# Patient Record
Sex: FEMALE | Race: BLACK OR AFRICAN AMERICAN | NOT HISPANIC OR LATINO | Employment: PART TIME | URBAN - METROPOLITAN AREA
[De-identification: names, ages, dates, MRNs, and addresses within clinical notes are randomized per-mention and may not be internally consistent; named-entity substitution may affect disease eponyms.]

---

## 2023-04-10 ENCOUNTER — HOSPITAL ENCOUNTER (EMERGENCY)
Facility: CLINIC | Age: 18
Discharge: HOME | End: 2023-04-10
Attending: EMERGENCY MEDICINE | Admitting: EMERGENCY MEDICINE
Payer: COMMERCIAL

## 2023-04-10 VITALS
OXYGEN SATURATION: 99 % | TEMPERATURE: 98.3 F | RESPIRATION RATE: 16 BRPM | DIASTOLIC BLOOD PRESSURE: 56 MMHG | SYSTOLIC BLOOD PRESSURE: 99 MMHG | HEART RATE: 62 BPM | WEIGHT: 133.2 LBS

## 2023-04-10 DIAGNOSIS — L50.9 URTICARIA: ICD-10-CM

## 2023-04-10 PROCEDURE — 99284 EMERGENCY DEPT VISIT MOD MDM: CPT

## 2023-04-10 RX ORDER — PREDNISONE 20 MG/1
TABLET ORAL
Qty: 10 TABLET | Refills: 0 | Status: SHIPPED | OUTPATIENT
Start: 2023-04-10

## 2023-04-10 RX ORDER — FAMOTIDINE 20 MG/1
20 TABLET, FILM COATED ORAL 2 TIMES DAILY
Qty: 28 TABLET | Refills: 0 | Status: SHIPPED | OUTPATIENT
Start: 2023-04-10 | End: 2023-04-24

## 2023-04-10 RX ORDER — DIPHENHYDRAMINE HCL 25 MG
25 CAPSULE ORAL EVERY 6 HOURS PRN
Qty: 30 CAPSULE | Refills: 0 | Status: SHIPPED | OUTPATIENT
Start: 2023-04-10

## 2023-04-10 ASSESSMENT — ENCOUNTER SYMPTOMS
SHORTNESS OF BREATH: 0
NAUSEA: 1
LIGHT-HEADEDNESS: 1

## 2023-04-10 ASSESSMENT — ACTIVITIES OF DAILY LIVING (ADL): ADLS_ACUITY_SCORE: 33

## 2023-04-11 NOTE — ED TRIAGE NOTES
Here for hives all over body, unsure of what caused this reaction.   Denies shortness of breath, no swelling on lips or inside mouth  No medications PTA     Triage Assessment     Row Name 04/10/23 2019       Triage Assessment (Adult)    Airway WDL WDL       Respiratory WDL    Respiratory WDL WDL       Skin Circulation/Temperature WDL    Skin Circulation/Temperature WDL X  rash all over body       Cardiac WDL    Cardiac WDL WDL       Peripheral/Neurovascular WDL    Peripheral Neurovascular WDL WDL       Cognitive/Neuro/Behavioral WDL    Cognitive/Neuro/Behavioral WDL WDL

## 2023-04-11 NOTE — ED NOTES
Discharge instructions discussed with patient and all questions answered at time of discharge. Pt agreeable with discharge plan of care. VSS. Pt breathing spontaneously, equal unlabored breaths at discharge. Denies any symptoms of SOB or swelling.     See provider assessment, level 4 acuity.

## 2023-04-11 NOTE — ED PROVIDER NOTES
EMERGENCY DEPARTMENT ENCOUNTER      NAME: Domonique Azar  AGE: 18 year old female  YOB: 2005  MRN: [id number]  EVALUATION DATE & TIME: 4/10/2023 10:34 PM    PCP: No Ref-Primary, Physician    ED PROVIDER: Pati Gonzalez MD      Chief Complaint   Patient presents with     Allergic Reaction         FINAL IMPRESSION:  1. Urticaria          ED COURSE & MEDICAL DECISION MAKING:    Pertinent Labs & Imaging studies reviewed. (See chart for details)  18 year old female presents to the Emergency Department for evaluation of urticaria in her posterior neck and upper extremities.  The patient denies any new exposures.  She denies any systemic symptoms with no throat tightness, shortness of breath, nausea, vomiting, diarrhea, abdominal pain.  She did not take anything for symptoms prior to arrival.  On my exam, the patient has a maculopapular rash on her posterior neck and her upper extremities faintly.  We will plan to treat for urticaria and place a referral to allergy as discussed with the patient.    I met with the patient, obtained history, performed an initial exam, and discussed options and plan for diagnostics and treatment here in the ED. PPE worn including surgical mask, surgical gloves.    At the conclusion of the encounter I discussed the results of all of the tests and the disposition. The questions were answered. The patient or family acknowledged understanding and was agreeable with the care plan.       Medical Decision Making    History:    Supplemental history from: Documented in chart, if applicable    External Record(s) reviewed: Documented in chart, if applicable.    Work Up:    Chart documentation includes differential considered and any EKGs or imaging independently interpreted by provider, where specified.    In additional to work up documented, I considered the following work up: Documented in chart, if applicable.    External consultation:    Discussion of management with another  provider: Documented in chart, if applicable    Complicating factors:    Care impacted by chronic illness: N/A    Care affected by social determinants of health: N/A    Disposition considerations: Discharge. I prescribed additional prescription strength medication(s) as charted. See documentation for any additional details.      MEDICATIONS GIVEN IN THE EMERGENCY:  Medications - No data to display    NEW PRESCRIPTIONS STARTED AT TODAY'S ER VISIT  Discharge Medication List as of 4/10/2023 11:01 PM      START taking these medications    Details   diphenhydrAMINE (BENADRYL) 25 MG capsule Take 1 capsule (25 mg) by mouth every 6 hours as needed for itching or allergies, Disp-30 capsule, R-0, E-Prescribe      famotidine (PEPCID) 20 MG tablet Take 1 tablet (20 mg) by mouth 2 times daily for 14 days, Disp-28 tablet, R-0, E-Prescribe      predniSONE (DELTASONE) 20 MG tablet Take two tablets (= 40mg) each day for 5 (five) days, Disp-10 tablet, R-0, E-Prescribe                =================================================================    HPI    Patient information was obtained from: Patient    Use of : N/A         Domonique Azar is a 18 year old female with a pertinent history of eczema who presents to this ED by walk in for evaluation of rash, possible allergic reaction. Patient states she noticed a rash when she left work at 8 PM. Patient states the rash is primarily localized to her neck, but is also present in her bilateral arms, upper back. She states the rash is itchy. She notes associated intermittent nausea and mild lightheadedness.    She endorses a similar rash in the past and [facility]. Patient states her rash then was caused by her allergy to trees and grass. She denies any recent exposures to new allergens such as new lotions, detergents, medications. No intraoral swelling, shortness of breath. No other reported complaints at this time.      REVIEW OF  SYSTEMS   Review of Systems   HENT:        Denies intraoral swelling.   Respiratory: Negative for shortness of breath.    Gastrointestinal: Positive for nausea (occasional).   Skin: Positive for rash (arms, neck , back).        Positive for skin itching.   Neurological: Positive for light-headedness (mild).   All other systems reviewed and are negative.       PAST MEDICAL HISTORY:  History reviewed. No pertinent past medical history.    PAST SURGICAL HISTORY:  History reviewed. No pertinent surgical history.        CURRENT MEDICATIONS:    diphenhydrAMINE (BENADRYL) 25 MG capsule  famotidine (PEPCID) 20 MG tablet  predniSONE (DELTASONE) 20 MG tablet        ALLERGIES:  Allergies   Allergen Reactions     Grass      Seasonal Allergies      Sulfamethoxazole W/Trimethoprim      Sulfamethoxazole-Trimethoprim Other (See Comments)     Trees        FAMILY HISTORY:  History reviewed. No pertinent family history.    SOCIAL HISTORY:   Social History     Socioeconomic History     Marital status: Single       VITALS:  BP 99/56   Pulse 62   Temp 98.3  F (36.8  C) (Temporal)   Resp 16   Wt 60.4 kg (133 lb 3.2 oz)   LMP 03/10/2023   SpO2 99%     PHYSICAL EXAM    Constitutional: Well developed, Well nourished, NAD  HENT: Normocephalic, Atraumatic, Bilateral external ears normal, Oropharynx normal, mucous membranes moist, Nose normal. No angioedema, no macroglossia.  Neck- Normal range of motion, No tenderness, Supple, No stridor.  Eyes: PERRL, EOMI, Conjunctiva normal, No discharge.   Respiratory: Normal breath sounds, No respiratory distress  Cardiovascular: Normal heart rate, Regular rhythm  GI: Bowel sounds normal, Soft, No tenderness,   Musculoskeletal: No edema. Good range of motion in all major joints. No tenderness to palpation or major deformities noted.   Integument: Warm, Dry, No erythema. Maculopapular rash of her posterior neck and upper extremities proximal to the elbows.  Neurologic: Alert & oriented x 3, Normal  motor function, Normal sensory function, No focal deficits noted. Normal gait.   Psychiatric: Affect normal, Judgment normal, Mood normal.      LAB:  All pertinent labs reviewed and interpreted.       RADIOLOGY:  Reviewed all pertinent imaging. Please see official radiology report.  No orders to display         I, Ry Joyce, am serving as a scribe to document services personally performed by Pati Gonzalez, based on my observation and the provider's statements to me. I, Pati Gonzalez MD, attest that Ry Joyce is acting in a scribe capacity, has observed my performance of the services and has documented them in accordance with my direction.    Pati Gonzalez MD  Emergency Medicine  [facility] EMERGENCY ROOM  [address] [id number] [phone]     Pati Gonzalez MD  04/10/23 9037

## 2023-05-21 ENCOUNTER — HEALTH MAINTENANCE LETTER (OUTPATIENT)
Age: 18
End: 2023-05-21

## 2023-08-01 PROCEDURE — 99283 EMERGENCY DEPT VISIT LOW MDM: CPT

## 2023-08-02 ENCOUNTER — HOSPITAL ENCOUNTER (EMERGENCY)
Facility: CLINIC | Age: 18
Discharge: HOME | End: 2023-08-02
Attending: EMERGENCY MEDICINE | Admitting: EMERGENCY MEDICINE
Payer: COMMERCIAL

## 2023-08-02 ENCOUNTER — APPOINTMENT (OUTPATIENT)
Dept: RADIOLOGY | Facility: CLINIC | Age: 18
End: 2023-08-02
Attending: EMERGENCY MEDICINE
Payer: COMMERCIAL

## 2023-08-02 VITALS
HEART RATE: 78 BPM | RESPIRATION RATE: 16 BRPM | SYSTOLIC BLOOD PRESSURE: 99 MMHG | OXYGEN SATURATION: 98 % | DIASTOLIC BLOOD PRESSURE: 52 MMHG | TEMPERATURE: 98 F | BODY MASS INDEX: 23 KG/M2 | WEIGHT: 125 LBS | HEIGHT: 62 IN

## 2023-08-02 DIAGNOSIS — M54.2 NECK PAIN: ICD-10-CM

## 2023-08-02 PROCEDURE — 72040 X-RAY EXAM NECK SPINE 2-3 VW: CPT

## 2023-08-02 ASSESSMENT — ACTIVITIES OF DAILY LIVING (ADL): ADLS_ACUITY_SCORE: 35
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
## 2023-08-02 NOTE — DISCHARGE INSTRUCTIONS
Tylenol 650 mg every 4 hours as needed for pain  Ibuprofen 600 mg every 6 hours as needed for pain  Ice to your neck for 10 to 15 minutes every 1-2 hours for the next 1 to 2 days  Follow-up with your primary care provider within the next week for recheck  No strenuous activity like running, jogging, jumping, or weightlifting for the next 1 to 2 weeks  Return to the emergency department for worsening problems or concerns
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
## 2024-01-27 ENCOUNTER — HOSPITAL ENCOUNTER (EMERGENCY)
Facility: CLINIC | Age: 19
Discharge: HOME | End: 2024-01-27
Attending: EMERGENCY MEDICINE | Admitting: EMERGENCY MEDICINE

## 2024-01-27 VITALS
HEART RATE: 71 BPM | TEMPERATURE: 98.1 F | OXYGEN SATURATION: 98 % | DIASTOLIC BLOOD PRESSURE: 78 MMHG | SYSTOLIC BLOOD PRESSURE: 114 MMHG | RESPIRATION RATE: 12 BRPM

## 2024-01-27 DIAGNOSIS — K08.89 PAIN, DENTAL: ICD-10-CM

## 2024-01-27 PROCEDURE — 99284 EMERGENCY DEPT VISIT MOD MDM: CPT

## 2024-01-27 PROCEDURE — 250N000013 HC RX MED GY IP 250 OP 250 PS 637: Performed by: EMERGENCY MEDICINE

## 2024-01-27 RX ORDER — IBUPROFEN 600 MG/1
600 TABLET, FILM COATED ORAL ONCE
Status: COMPLETED | OUTPATIENT
Start: 2024-01-27 | End: 2024-01-27

## 2024-01-27 RX ORDER — HYDROCODONE BITARTRATE AND ACETAMINOPHEN 5; 325 MG/1; MG/1
1 TABLET ORAL EVERY 6 HOURS PRN
Qty: 10 TABLET | Refills: 0 | Status: SHIPPED | OUTPATIENT
Start: 2024-01-27 | End: 2024-01-30

## 2024-01-27 RX ORDER — HYDROCODONE BITARTRATE AND ACETAMINOPHEN 5; 325 MG/1; MG/1
1 TABLET ORAL ONCE
Status: COMPLETED | OUTPATIENT
Start: 2024-01-27 | End: 2024-01-27

## 2024-01-27 RX ORDER — PENICILLIN V POTASSIUM 500 MG/1
500 TABLET, FILM COATED ORAL 4 TIMES DAILY
Qty: 28 TABLET | Refills: 0 | Status: SHIPPED | OUTPATIENT
Start: 2024-01-27 | End: 2024-02-03

## 2024-01-27 RX ORDER — IBUPROFEN 600 MG/1
600 TABLET, FILM COATED ORAL EVERY 6 HOURS PRN
Qty: 30 TABLET | Refills: 0 | Status: SHIPPED | OUTPATIENT
Start: 2024-01-27

## 2024-01-27 RX ADMIN — IBUPROFEN 600 MG: 600 TABLET ORAL at 06:28

## 2024-01-27 RX ADMIN — HYDROCODONE BITARTRATE AND ACETAMINOPHEN 1 TABLET: 5; 325 TABLET ORAL at 06:28

## 2024-01-27 NOTE — ED TRIAGE NOTES
Lower right dental pain. Facial swelling present. Painful mastication. Denies throat pain. Pain radiates to inferior of right ear. Pain began 3 days ago. Feels that a filling was cracked. Had the filling placed 3 to 4 months ago. States gums also feel sore.

## 2024-01-27 NOTE — ED PROVIDER NOTES
EMERGENCY DEPARTMENT ENCOUNTER      NAME: Domonique Azar  AGE: 18 year old female  YOB: 2005  MRN: [id number]  EVALUATION DATE & TIME: 1/27/2024  6:03 AM    PCP: No Ref-Primary, Physician    ED PROVIDER: Jonathan Torres M.D.      Chief Complaint   Patient presents with    Dental Pain         FINAL IMPRESSION:  1. Pain, dental          ED COURSE & MEDICAL DECISION MAKING:    Pertinent Labs & Imaging studies reviewed. (See chart for details)  18 year old female presents to the Emergency Department for evaluation of lower right dental pain symptoms ongoing the last 3 days.  Has noted some slight increase swelling on the right side also.  Worsens significantly with chewing.  Review of records indicate no chronic disease states.  Patient arrives with significant other who provides associated information.  Examination of teeth resulting in general good repair.  She has marked percussive tenderness to tooth #30.  Gingiva appears normal.  Slight fullness along the right mandible but no obvious fluctuance or masses.  No adenopathy.  Neck supple.  Cardiac exam remarkable for absence of murmur.  Will treat symptomatically with ibuprofen and hydrocodone here.  Patient discharged with hydrocodone/Motrin and bacillin.  Patient reports she had contacted her dentist and has follow-up available next week.  Patient appears non toxic with stable vitals signs. Overall exam is benign.  Given uncomplicated dental pain no indications for laboratory evaluation or imaging.          6:01 AM I met with the patient for the initial interview and physical examination. Discussed plan for treatment and workup in the ED.    6:21 AM Patient will be discharged with prescription medications.    At the conclusion of the encounter I discussed the results of all of the tests and the disposition. The questions were answered and return precautions provided. The patient or family acknowledged understanding and was agreeable with the care plan.        PPE: Provider wore paper mask.     MEDICATIONS GIVEN IN THE EMERGENCY:  Medications   HYDROcodone-acetaminophen (NORCO) 5-325 MG per tablet 1 tablet (has no administration in time range)   ibuprofen (ADVIL/MOTRIN) tablet 600 mg (has no administration in time range)       NEW PRESCRIPTIONS STARTED AT TODAY'S ER VISIT  New Prescriptions    HYDROCODONE-ACETAMINOPHEN (NORCO) 5-325 MG TABLET    Take 1 tablet by mouth every 6 hours as needed    IBUPROFEN (ADVIL/MOTRIN) 600 MG TABLET    Take 1 tablet (600 mg) by mouth every 6 hours as needed for moderate pain    PENICILLIN V (VEETID) 500 MG TABLET    Take 1 tablet (500 mg) by mouth 4 times daily for 7 days          =================================================================    HPI    Patient information was obtained from: Patient    Use of Intrepreter: N/A        Domoniqueelva Azar is a 18 year old female with a pertient medical history of tonsillectomy with adenoidectomy, who presents to the ED for evaluation of dental pain.    Patient reports onset of dental pain 3 days ago.  Localizes it to the lower right.  Worsens significantly with chewing and pressure.  Denies any obvious cracks or deficits in the tooth.  Reports last seen by dentist approximately a year ago.  Denies any fevers or chills.  No nausea or vomiting or other signs of systemic illness.    REVIEW OF SYSTEMS   Constitutional:  Denies fever, chills  Respiratory:  Denies productive cough or increased work of breathing  Cardiovascular:  Denies chest pain, palpitations  GI:  Denies abdominal pain, nausea, vomiting, or change in bowel or bladder habits   Musculoskeletal:  Denies any new muscle/joint swelling  Skin:  Denies rash   Neurologic:  Denies focal weakness  All systems negative except as marked.     PAST MEDICAL HISTORY:  No past medical history on file.    PAST SURGICAL HISTORY:  No past surgical history on file.      CURRENT MEDICATIONS:      Current Facility-Administered Medications:      HYDROcodone-acetaminophen (NORCO) 5-325 MG per tablet 1 tablet, 1 tablet, Oral, Once, Jonathan Torres MD    ibuprofen (ADVIL/MOTRIN) tablet 600 mg, 600 mg, Oral, Once, Jonathan Torres MD    Current Outpatient Medications:     HYDROcodone-acetaminophen (NORCO) 5-325 MG tablet, Take 1 tablet by mouth every 6 hours as needed, Disp: 10 tablet, Rfl: 0    ibuprofen (ADVIL/MOTRIN) 600 MG tablet, Take 1 tablet (600 mg) by mouth every 6 hours as needed for moderate pain, Disp: 30 tablet, Rfl: 0    penicillin V (VEETID) 500 MG tablet, Take 1 tablet (500 mg) by mouth 4 times daily for 7 days, Disp: 28 tablet, Rfl: 0    diphenhydrAMINE (BENADRYL) 25 MG capsule, Take 1 capsule (25 mg) by mouth every 6 hours as needed for itching or allergies, Disp: 30 capsule, Rfl: 0    predniSONE (DELTASONE) 20 MG tablet, Take two tablets (= 40mg) each day for 5 (five) days, Disp: 10 tablet, Rfl: 0    ALLERGIES:  Allergies   Allergen Reactions    Grass     Seasonal Allergies     Sulfamethoxazole-Trimethoprim     Sulfamethoxazole-Trimethoprim Other (See Comments)    Trees        FAMILY HISTORY:  No family history on file.    SOCIAL HISTORY:   Social History     Socioeconomic History    Marital status: Single       VITALS:  Patient Vitals for the past 24 hrs:   BP Temp Temp src Pulse Resp SpO2   01/27/24 0553 114/78 98.1  F (36.7  C) Oral 71 12 98 %        PHYSICAL EXAM    Constitutional:  Awake, alert, in mild apparent distress  HENT:  Normocephalic, Atraumatic. Bilateral external ears normal. Oropharynx moist. Nose normal. Neck- Normal range of motion with no guarding,Supple, No stridor.  No adenopathy.  Dentition in general good repair.  Percussive tenderness to tooth #30.  Gingiva normal.  Eyes:  PERRL, EOMI with no signs of entrapment, Conjunctiva normal, No discharge.   Respiratory:   No respiratory distress, No wheezing.    Cardiovascular:  Normal heart rate, Normal rhythm, No appreciable rubs or gallops.   Musculoskeletal:  No edema.  Good range of motion in all major joints. No tenderness to palpation or major deformities noted.  Integument:  Warm, Dry, No erythema, No rash.   Neurologic:  Alert & oriented, Normal motor function, Normal sensory function, No focal deficits noted.   Psychiatric:  Affect normal, Judgment normal, Mood normal.       I, Sulema Costello, am serving as a scribe to document services personally performed by Jonathan Torres MD, based on my observation and the provider's statements to me. I, Jonathan Torres MD attest that Sulema Costello is acting in a scribe capacity, has observed my performance of the services and has documented them in accordance with my direction.    Jonathan Torres M.D.  Emergency Medicine  [facility] EMERGENCY ROOM     Jonathan Torres MD  01/27/24 0662

## 2024-02-07 ENCOUNTER — HOSPITAL ENCOUNTER (EMERGENCY)
Facility: CLINIC | Age: 19
Discharge: HOME | End: 2024-02-07
Attending: STUDENT IN AN ORGANIZED HEALTH CARE EDUCATION/TRAINING PROGRAM | Admitting: STUDENT IN AN ORGANIZED HEALTH CARE EDUCATION/TRAINING PROGRAM

## 2024-02-07 VITALS
BODY MASS INDEX: 25.01 KG/M2 | HEIGHT: 68 IN | SYSTOLIC BLOOD PRESSURE: 127 MMHG | HEART RATE: 80 BPM | DIASTOLIC BLOOD PRESSURE: 78 MMHG | TEMPERATURE: 98.4 F | WEIGHT: 165 LBS | RESPIRATION RATE: 16 BRPM | OXYGEN SATURATION: 100 %

## 2024-02-07 DIAGNOSIS — K08.89 PAIN, DENTAL: ICD-10-CM

## 2024-02-07 PROCEDURE — 250N000013 HC RX MED GY IP 250 OP 250 PS 637: Performed by: STUDENT IN AN ORGANIZED HEALTH CARE EDUCATION/TRAINING PROGRAM

## 2024-02-07 PROCEDURE — 99283 EMERGENCY DEPT VISIT LOW MDM: CPT

## 2024-02-07 RX ORDER — ACETAMINOPHEN 325 MG/1
650 TABLET ORAL ONCE
Status: COMPLETED | OUTPATIENT
Start: 2024-02-07 | End: 2024-02-07

## 2024-02-07 RX ORDER — OXYCODONE HYDROCHLORIDE 5 MG/1
5 TABLET ORAL ONCE
Status: COMPLETED | OUTPATIENT
Start: 2024-02-07 | End: 2024-02-07

## 2024-02-07 RX ORDER — IBUPROFEN 600 MG/1
600 TABLET, FILM COATED ORAL ONCE
Status: COMPLETED | OUTPATIENT
Start: 2024-02-07 | End: 2024-02-07

## 2024-02-07 RX ADMIN — ACETAMINOPHEN 650 MG: 325 TABLET ORAL at 22:13

## 2024-02-07 RX ADMIN — AMOXICILLIN AND CLAVULANATE POTASSIUM 1 TABLET: 875; 125 TABLET, FILM COATED ORAL at 22:13

## 2024-02-07 RX ADMIN — OXYCODONE 5 MG: 5 TABLET ORAL at 22:13

## 2024-02-07 RX ADMIN — IBUPROFEN 600 MG: 600 TABLET ORAL at 22:13

## 2024-02-08 NOTE — ED TRIAGE NOTES
Patient arrives to the ER with c/o dental pain. Patient saw a dentist today, stated that she has gingivitis and that she needs a tooth pulled. Which has been scheduled for next week. Patient has 10/10 pain. Has not taken anything for pain and she did not receive pain meds or antibiotics at the appointment.     Triage Assessment (Adult)       Row Name 02/07/24 8838          Triage Assessment    Airway WDL WDL        Respiratory WDL    Respiratory WDL WDL        Skin Circulation/Temperature WDL    Skin Circulation/Temperature WDL WDL        Cardiac WDL    Cardiac WDL WDL        Peripheral/Neurovascular WDL    Peripheral Neurovascular WDL WDL        Cognitive/Neuro/Behavioral WDL    Cognitive/Neuro/Behavioral WDL WDL

## 2024-02-08 NOTE — ED PROVIDER NOTES
Emergency Department Encounter         FINAL IMPRESSION:  Dental pain        ED COURSE AND MEDICAL DECISION MAKING            18-year-old female here with tooth #30 pain.  Increased swelling today.  Saw dentistry earlier in was scheduled for tooth extraction on Friday next week.  Started having worsening swelling tonight which initiated the visit.  No fevers chills or systemic symptoms.  No trismus.  No dysphagia.    On arrival she looks well.  Is obvious right-sided facial swelling with no overlying cellulitic component.  No trismus.  Tongue is normal.  Floor of mouth is normal.  Percussive tenderness to tooth #30.  No signs of Periapical abscess.  No signs of deep space infection of the neck.  Full range of motion of the neck.  Will treat with Augmentin, pain medication here and outpatient follow-up.                   Medical Decision Making  Obtained supplemental history:Supplemental history obtained?: No  Reviewed external records: External records reviewed?: No  Care impacted by chronic illness:N/A  Care significantly affected by social determinants of health:N/A  Did you consider but not order tests?: Work up considered but not performed and documented in chart, if applicable  Did you interpret images independently?: Independent interpretation of ECG and images noted in documentation, when applicable.  Consultation discussion with other provider:Did you involve another provider (consultant, MH, pharmacy, etc.)?: No  Discharge. I prescribed additional prescription strength medication(s) as charted. See documentation for any additional details.              EKG        Critical Care     Performed by: Arya Farrar or    Authorized by: Arya Farrar  Total critical care time:  minutes  Critical care was necessary to treat or prevent imminent or life-threatening deterioration of the following conditions:   Critical care was time spent personally by me on the following activities: development of treatment plan with patient  or surrogate, discussions with consultants, examination of patient, evaluation of patient's response to treatment, obtaining history from patient or surrogate, ordering and performing treatments and interventions, ordering and review of laboratory studies, ordering and review of radiographic studies, re-evaluation of patient's condition and monitoring for potential decompensation.  Critical care time was exclusive of separately billable procedures and treating other patients.'    At the conclusion of the encounter I discussed the results of all the tests and the disposition. The questions were answered. The patient or family acknowledged understanding and was agreeable with the care plan.        MEDICATIONS GIVEN IN THE EMERGENCY DEPARTMENT:  Medications   amoxicillin-clavulanate (AUGMENTIN) 875-125 MG per tablet 1 tablet (has no administration in time range)   ibuprofen (ADVIL/MOTRIN) tablet 600 mg (has no administration in time range)   acetaminophen (TYLENOL) tablet 650 mg (has no administration in time range)   oxyCODONE (ROXICODONE) tablet 5 mg (has no administration in time range)       NEW PRESCRIPTIONS STARTED AT TODAY'S ED VISIT:  New Prescriptions    AMOXICILLIN-CLAVULANATE (AUGMENTIN) 875-125 MG TABLET    Take 1 tablet by mouth 2 times daily for 7 days       HPI       19yo female here with right lower jaw pain and swelling with recent visit to dentistry.  Finished antibiotics recently as well.  Woke up with worsening swelling today.  No difficulty swallowing, trismus, or decreased range of motion of her neck.  Tolerating p.o.  No fevers night sweats or chills.          MEDICAL HISTORY     No past medical history on file.    No past surgical history on file.         amoxicillin-clavulanate (AUGMENTIN) 875-125 MG tablet  diphenhydrAMINE (BENADRYL) 25 MG capsule  ibuprofen (ADVIL/MOTRIN) 600 MG tablet  predniSONE (DELTASONE) 20 MG tablet            PHYSICAL EXAM     /78   Pulse 80   Temp 98.4  F  "(36.9  C) (Temporal)   Resp 16   Ht 1.727 m (5' 8\")   Wt 74.8 kg (165 lb)   SpO2 100%   BMI 25.09 kg/m        PHYSICAL EXAM:     General: Patient appears well, nontoxic, comfortable  HEENT: Moist mucous membranes,  No head trauma.  Right-sided facial swelling along the right lower mandibular margin.  No overlying cellulitis.  No trismus.  Oral examination showing no periapical abscess.  Floor of mouth is normal.  Posterior oropharynx normal.  Tongue normal.  Neurological: Alert and oriented, grossly neurologically intact.  Psychological: Normal affect and mood.  Integument: No rashes appreciated          RESULTS       Labs Ordered and Resulted from Time of ED Arrival to Time of ED Departure - No data to display    No orders to display         PROCEDURES:  Procedures:  Procedures       Arya Farrar DO  Emergency Medicine  [facility] EMERGENCY ROOM       Arya Farrar DO  02/07/24 2217    "

## 2024-02-08 NOTE — DISCHARGE INSTRUCTIONS
If needed, I listed other dental clinics to see if you are unable to see your doctor on Friday, or need to be seen sooner.  Please monitor the swelling.  Use ice packs for pain.    Please take 500mg of tylenol every 4 hours as well as 600mg of ibuprofen every 6 hours for pain. Do not take more than 4,000mg of tylenol in 24hrs.    Take the Augmentin as prescribed.  Return for worsening swelling, difficulty breathing, or any other concerning symptoms            [facility]   The Dental Emergency Room  [address], [facility]  [phone] Accepts [place]    [facility]  [address] , [facility]  [phone] No Fee Hours and services vary each month - call them before going.  Sometimes only offer extractions.   [facility]  [address] ,[facility]  [phone] Sliding fee: ER visit - $50 up front  regular - $35 up front Call or arrive at 7:45 AM on Mondays, Tues, or Thursdays to sign up for same-day appointments for dentalpain / emergencies.        [facility] Sliding  fee  Call for details Walk-ins and same-day appointmentsin's accepted 8-11AM and 1-4PM  Monday-Friday   [address]     [phone]          [facility] ([facility]) dental Sliding fee If no insurance, call first.    [address], [place]     [phone]          Located within [facility]  [address], [facility]  [phone] Sliding fee Call 8:00 AM Mon-Thurs for next-day  appointments (for emergencies/pain only) Help with [place] paperwork is available.        [facility]  [address], [facility]  [phone] Call for fees Only for adult dental emergencies.  Costs about 30% less than private practice clinics  To sign up for the regular dental clinic, call [phone].        [facility] ([facility])  [address]  [phone] Sliding fee scale For people without dentalinsurance only.   Cleaning, xray, exams, fillings, sealants only - no  extractions or rootcanals, etc.  No walk-ins.        [facility]  [address] [place] [address]  [phone] No Fee No walk-ins, not accepting people with insurance. Extractions for uninsured people only. Call Friday 2PM to get on next week's list        [facility]   [address] [facility]  [phone] Sliding fee  $40 up front No walk-ins. Call at 8AM Mon-Fri forsame- day visits.  Can schedule Saturday emergency visits by calling Friday morning.   [facility]  [address] [facility]  [phone] Sliding fee  Call for details No walk-ins.  For emergency appointments:  Call on Thursday 3PM (for Friday appt) OR Call on Friday 3PM (for Monday appt)        [facility]  [address] [facility]  [phone] Sliding fee -   Call for details Call on Tuesdays at 3PM to get anurgent Weds. appointment.  Call anytime during business hours to schedule other appointments.             OTHER RESOURCES       [facility],   [address]  [address] [place]  [address]  [phone]    [facility]  [address] [place] [address]  [phone]      Referral Service, 3-800-DENTIST        Open 9a to 9p 7 days a week          7 days a week 7a to 5p.                [employer] [facility], [phone] For people who are elderly,disabled, or medically compromised and have no other way to pay for dental care. Call to get an application. If approved, services are provided through volunteer dentists.

## 2024-07-28 ENCOUNTER — HEALTH MAINTENANCE LETTER (OUTPATIENT)
Age: 19
End: 2024-07-28

## 2025-06-08 ENCOUNTER — HOSPITAL ENCOUNTER (EMERGENCY)
Facility: HOSPITAL | Age: 20
Discharge: HOME | End: 2025-06-09
Payer: COMMERCIAL

## 2025-06-08 VITALS
OXYGEN SATURATION: 100 % | BODY MASS INDEX: 19.31 KG/M2 | DIASTOLIC BLOOD PRESSURE: 69 MMHG | SYSTOLIC BLOOD PRESSURE: 111 MMHG | TEMPERATURE: 98 F | RESPIRATION RATE: 18 BRPM | HEART RATE: 73 BPM | WEIGHT: 127 LBS

## 2025-06-08 DIAGNOSIS — J30.2 SEASONAL ALLERGIES: ICD-10-CM

## 2025-06-08 DIAGNOSIS — L50.9 URTICARIA: ICD-10-CM

## 2025-06-08 PROCEDURE — 99284 EMERGENCY DEPT VISIT MOD MDM: CPT

## 2025-06-08 RX ORDER — FAMOTIDINE 20 MG/1
20 TABLET, FILM COATED ORAL 2 TIMES DAILY PRN
Qty: 60 TABLET | Refills: 0 | Status: SHIPPED | OUTPATIENT
Start: 2025-06-08

## 2025-06-08 RX ORDER — PREDNISONE 20 MG/1
TABLET ORAL
Qty: 10 TABLET | Refills: 0 | Status: SHIPPED | OUTPATIENT
Start: 2025-06-08

## 2025-06-08 RX ORDER — FLUTICASONE PROPIONATE 50 MCG
1 SPRAY, SUSPENSION (ML) NASAL DAILY
Qty: 16 G | Refills: 0 | Status: SHIPPED | OUTPATIENT
Start: 2025-06-08

## 2025-06-08 RX ORDER — DIPHENHYDRAMINE HCL 25 MG
25 CAPSULE ORAL EVERY 6 HOURS PRN
Qty: 90 CAPSULE | Refills: 0 | Status: SHIPPED | OUTPATIENT
Start: 2025-06-08

## 2025-06-08 RX ORDER — TRIAMCINOLONE ACETONIDE 1 MG/G
OINTMENT TOPICAL 2 TIMES DAILY PRN
Qty: 30 G | Refills: 0 | Status: SHIPPED | OUTPATIENT
Start: 2025-06-08

## 2025-06-08 RX ORDER — CETIRIZINE HYDROCHLORIDE 10 MG/1
10 TABLET ORAL 2 TIMES DAILY PRN
Qty: 60 TABLET | Refills: 0 | Status: SHIPPED | OUTPATIENT
Start: 2025-06-08

## 2025-06-08 ASSESSMENT — COLUMBIA-SUICIDE SEVERITY RATING SCALE - C-SSRS
6. HAVE YOU EVER DONE ANYTHING, STARTED TO DO ANYTHING, OR PREPARED TO DO ANYTHING TO END YOUR LIFE?: NO
2. HAVE YOU ACTUALLY HAD ANY THOUGHTS OF KILLING YOURSELF IN THE PAST MONTH?: NO
1. IN THE PAST MONTH, HAVE YOU WISHED YOU WERE DEAD OR WISHED YOU COULD GO TO SLEEP AND NOT WAKE UP?: NO

## 2025-06-09 NOTE — DISCHARGE INSTRUCTIONS
You were seen here in the ER for a refill of medications for your allergies. Your exam here is reassuring without evidence of serious/life threatening allergic reaction.  I will get you refills of some medications including an ointment to apply to the itchy areas.  Benadryl as needed for itching.  Zyrtec daily to prevent itching.  Flonase nasal spray daily.  Prednisone daily for 5 days.  And Pepcid 2 tabs daily as needed.  Could be beneficial to make an appointment with a primary doctor who can fill these medications with you going forward so that you do not have to continue to come back to the ER for refills although we are always happy to see you for any thing that you need as we are open 24/7.  If you develop any new or worsening symptoms including difficulty breathing, throat swelling, etc. return to the emergency department.

## 2025-06-09 NOTE — ED PROVIDER NOTES
"EMERGENCY DEPARTMENT ENCOUNTER      NAME: Domonique Azar  AGE: 20 year old female  YOB: 2005  MRN: [id number]  EVALUATION DATE & TIME: No admission date for patient encounter.    PCP: No Ref-Primary, Physician    ED PROVIDER: Cherise Milton PA-C    CHIEF COMPLAINT  Medication Refill      FINAL IMPRESSION:      ICD-10-CM    1. Urticaria  L50.9       2. Seasonal allergies  J30.2           MEDICAL DECISION MAKING AND ED COURSE:  Pertinent Labs & Imaging studies reviewed (See chart for details)  20-year-old female with a history of seasonal allergies/allergies to grass who presents to the ER today with concern for allergic reaction.  Reports that she has been allergic to grass since she was 4 years old and takes medications every day as prevention as well as as needed for symptoms. Recently she was outside more than normal and for the past 2 weeks has noticed itchy rash on her arms and chest and face, as well as runny nose and watery eyes. Reports that she comes in to the ER \"monthly\" for refills of these meds because she has no primary doctor (Zyrtec, Benadryl, fluticasone nasal spray, prednisone, triamcinolone ointment, and Pepcid). Denies any shortness of breath, vomiting, throat swelling.  Here for medication refill.    Vitals reviewed and unremarkable.  Seen in the waiting room due to boarding crisis.  On exam, faint rash noted to the chest and bilateral upper extremities.  EOMs intact.  PERRLA.  No periorbital edema. Breathing is unlabored and speaking in full sentences.  Lungs clear to auscultation without wheezing, stridor, crackles.  Posterior pharynx pink and moist.  No angioedema or lesions.  Phonating appropriately.      Patient is clinically well-appearing in no distress.  Certainly no signs of anaphylaxis. Rash appears as urticaria, no signs of blisters or bullae. Certainly does not look like SJS/TEN or some other form of rash and patient reports its the same rash she gets every time she is " near grass. On chart review, she has been seen for this in the past and has been given refills of her medications.  I did emphasize following up with a primary doctor instead of utilizing the emergency department every month for refills.  Given a refill of her medications including Benadryl, Zyrtec, triamcinolone, Flonase nasal spray, as well as a short course of prednisone.  She was agreeable and discharged in stable condition.  All questions answered.       MEDICATIONS GIVEN IN THE EMERGENCY:  Medications - No data to display    NEW PRESCRIPTIONS STARTED AT TODAY'S ER VISIT  Discharge Medication List as of 6/8/2025 11:39 PM        START taking these medications    Details   cetirizine (ZYRTEC) 10 MG tablet Take 1 tablet (10 mg) by mouth 2 times daily as needed for allergies (1 tab up to twice a day as needed for itch, rash, hives, allergy)., Disp-60 tablet, R-0, E-Prescribe      !! diphenhydrAMINE (BENADRYL) 25 MG capsule Take 1 capsule (25 mg) by mouth every 6 hours as needed for itching or allergies., Disp-90 capsule, R-0, E-Prescribe      famotidine (PEPCID) 20 MG tablet Take 1 tablet (20 mg) by mouth 2 times daily as needed (gastritis)., Disp-60 tablet, R-0, E-Prescribe      fluticasone (FLONASE) 50 MCG/ACT nasal spray Spray 1 spray into both nostrils daily., Disp-16 g, R-0, E-Prescribe      !! predniSONE (DELTASONE) 20 MG tablet Take two tablets (= 40mg) each day for 5 (five) days, Disp-10 tablet, R-0, E-Prescribe      triamcinolone (KENALOG) 0.1 % external ointment Apply topically 2 times daily as needed for irritation.Disp-30 g, [id number]       !! - Potential duplicate medications found. Please discuss with provider.        Discharge Medication List as of 6/8/2025 11:39 PM          =================================================================    HPI    Patient information was obtained from: patient  Use of : N/A    Domonique Azar is a 20 year old female who presents to this ED by private  car for evaluation of medication refill.  Patient reports that she is experiencing an allergic reaction from grass. Endorses rashes on face, arms, behind knees, ankles, and feet. She reports that this has been an ongoing issue since she was 4 years old, and she has to come in regularly to refill her medications. Her current episode began 2 weeks ago. She reports that she normally is prescribed zyrtec, triamcinolone, benadryl, nasal spray and methylprednisolone. She denies any changes in medications, recent travel, and any new creams/detergents. Additionally denies difficulty breathing and throat swelling. She notes one episode of emesis last week, but is unsure if it related to her reaction.    PHYSICAL EXAM    /69   Pulse 73   Temp 98  F (36.7  C)   Resp 18   Wt 57.6 kg (127 lb)   SpO2 100%   BMI 19.31 kg/m    Physical Exam  Vitals and nursing note reviewed.   Constitutional:       General: She is not in acute distress.     Appearance: Normal appearance. She is not ill-appearing.   HENT:      Head: Normocephalic and atraumatic.      Nose: Nose normal.      Mouth/Throat:      Mouth: Mucous membranes are moist. No oral lesions or angioedema.      Tongue: No lesions.      Palate: No lesions.      Pharynx: Oropharynx is clear. Uvula midline. No pharyngeal swelling, oropharyngeal exudate, posterior oropharyngeal erythema, uvula swelling or postnasal drip.   Eyes:      General: Lids are normal.         Right eye: No discharge.         Left eye: No discharge.      Extraocular Movements:      Right eye: Normal extraocular motion.      Left eye: Normal extraocular motion.      Conjunctiva/sclera: Conjunctivae normal.      Right eye: Right conjunctiva is not injected.      Left eye: Left conjunctiva is not injected.      Pupils: Pupils are equal, round, and reactive to light.   Cardiovascular:      Rate and Rhythm: Normal rate.   Pulmonary:      Effort: Pulmonary effort is normal.      Breath sounds: Normal  breath sounds.   Musculoskeletal:         General: Normal range of motion.      Cervical back: Full passive range of motion without pain. No edema or erythema. Normal range of motion.   Skin:     General: Skin is warm.      Capillary Refill: Capillary refill takes less than 2 seconds.      Findings: Rash present.   Neurological:      General: No focal deficit present.      Mental Status: She is alert and oriented to person, place, and time. Mental status is at baseline.      Cranial Nerves: No cranial nerve deficit.      Sensory: No sensory deficit.      Motor: No weakness.      Gait: Gait normal.   Psychiatric:         Mood and Affect: Mood normal.         LAB:  All pertinent labs reviewed and interpreted.       RADIOLOGY:  Reviewed all pertinent imaging. Please see official radiology report.  No orders to display       Medical Decision Making  I reviewed the EMR: Outpatient Record: ER visit from 4/10/23  Discharge. I prescribed additional prescription strength medication(s) as charted. See documentation for any additional details.    MIPS (CTPE, Dental pain, Whalen, Sinusitis, Asthma/COPD, Head Trauma):     SEPSIS: None      I, Sheila Zacarias, am serving as a scribe to document services personally performed by Cherise Milton PA-C based on my observation and the provider's statements to me. I, Cherise Milton PA-C, attest that Sheila Zacarias is acting in a scribe capacity, has observed my performance of the services and has documented them in accordance with my direction.    Chreise Milton PA-C   [facility] EMERGENCY DEPARTMENT  [address] [place] [id number]  [phone]      Cherise Milton PA-C  06/10/25 6621

## 2025-07-22 ENCOUNTER — HOSPITAL ENCOUNTER (EMERGENCY)
Facility: HOSPITAL | Age: 20
Discharge: HOME | End: 2025-07-22
Attending: STUDENT IN AN ORGANIZED HEALTH CARE EDUCATION/TRAINING PROGRAM | Admitting: STUDENT IN AN ORGANIZED HEALTH CARE EDUCATION/TRAINING PROGRAM
Payer: COMMERCIAL

## 2025-07-22 VITALS
WEIGHT: 122 LBS | BODY MASS INDEX: 22.45 KG/M2 | DIASTOLIC BLOOD PRESSURE: 60 MMHG | OXYGEN SATURATION: 98 % | HEART RATE: 89 BPM | RESPIRATION RATE: 20 BRPM | TEMPERATURE: 98 F | HEIGHT: 62 IN | SYSTOLIC BLOOD PRESSURE: 121 MMHG

## 2025-07-22 DIAGNOSIS — L50.9 URTICARIA: ICD-10-CM

## 2025-07-22 DIAGNOSIS — Z76.0 ENCOUNTER FOR MEDICATION REFILL: ICD-10-CM

## 2025-07-22 PROCEDURE — 99283 EMERGENCY DEPT VISIT LOW MDM: CPT | Performed by: STUDENT IN AN ORGANIZED HEALTH CARE EDUCATION/TRAINING PROGRAM

## 2025-07-22 RX ORDER — TRIAMCINOLONE ACETONIDE 1 MG/G
OINTMENT TOPICAL 2 TIMES DAILY PRN
Qty: 80 G | Refills: 0 | Status: SHIPPED | OUTPATIENT
Start: 2025-07-22 | End: 2025-07-29

## 2025-07-22 ASSESSMENT — COLUMBIA-SUICIDE SEVERITY RATING SCALE - C-SSRS
6. HAVE YOU EVER DONE ANYTHING, STARTED TO DO ANYTHING, OR PREPARED TO DO ANYTHING TO END YOUR LIFE?: NO
1. IN THE PAST MONTH, HAVE YOU WISHED YOU WERE DEAD OR WISHED YOU COULD GO TO SLEEP AND NOT WAKE UP?: NO
2. HAVE YOU ACTUALLY HAD ANY THOUGHTS OF KILLING YOURSELF IN THE PAST MONTH?: NO

## 2025-07-23 NOTE — ED PROVIDER NOTES
NAME: Domonique Azar  AGE: 20 year old female  YOB: 2005  MRN: [id number]  EVALUATION DATE & TIME: No admission date for patient encounter.    PCP: No Ref-Primary, Physician  ED PROVIDER: Anamika Waters MD.    Chief Complaint   Patient presents with    Medication Refill       FINAL IMPRESSION:  1. Encounter for medication refill    2. Urticaria        MEDICAL DECISION MAKIN:00 PM I met with the patient, obtained history, performed an initial exam, and discussed options and plan for diagnostics and treatment here in the ED.   9:08 PM I discussed plan for discharge including reasons to return to the ED such as new or worsening symptoms. Patient is agreeable with this plan at this time. All questions answered.     20-year-old female who presents with medication refill.  Patient reports she is allergic to grass and gets hives from this.  She is looking for refill on steroid cream it has been helpful in the past.  She tried Benadryl prior to arriving.  She has not been taking Zyrtec or other medications for it.  No fevers and is well-appearing on exam.  No signs of anaphylaxis or upper airway swelling. No new detergents, lotions, meds, etc. I sent a refill for steroid cream but advised her to use it only for a limited time. I sent an allergy referral. Recommended close outpatient follow up. Strict return precautions discussed and patient is in agreement with plan, endorses understanding and their questions were answered.    Medical Decision Making      Discharge. I prescribed additional prescription strength medication(s) as charted. I considered admission, but ultimately discharged patient given reassuring exam, vitals.    MIPS (CTPE, Dental pain, Whalen, Sinusitis, Asthma/COPD, Head Trauma): Not Applicable    SEPSIS: None    MEDICATIONS GIVEN IN THE EMERGENCY:  Medications - No data to display    NEW PRESCRIPTIONS STARTED AT TODAY'S ER VISIT:  New Prescriptions    No medications on file       "  =================================================================  HPI    Patient information was obtained from: Patient  Use of : N/A       Domonique Azar is a 20 year old female with a past medical history of grass allergy, who presents via walk-in for medication refill.     Patient presents to the ER for a steroid cream refill for her grass allergy. Patient states that grass causes her to develop hives to her lower extremities, back, abdomen, chest, and face. She takes multiple medication for the allergy such as benadryl and Zyrtec, but states she does not take the medications consistently. She states she has not been properly worked up for the allergy in the past.     Currently, patient reports hives to her bilateral lower extremity that is itchy. She is trying to get ahead of the reaction, so it does spread further. She denies any new foods, mediations, lotions, soaps, or detergents. No fevers. No nausea or vomiting. Patient is otherwise healthy and does not feel physically sick. No other symptoms, complaints, or concerns at this time.     Per chart review:  6/8/2025 Patient was seen at [facility] ED for a medication refill. Patient has been allergic to grass since she was 4 years old and takes medications every day to prevent reactions. She presents to the ED for \"monthly\" refills as she has no PCP. Patient needs refill for Zyrtec, Benadryl, fluticasone nasal spray, prednisone, triamcinolone ointment, and Pepcid.     PHYSICAL EXAM:    Vitals: /60   Pulse 89   Temp 98  F (36.7  C) (Oral)   Resp 20   Ht 1.575 m (5' 2\")   Wt 55.3 kg (122 lb)   LMP 07/01/2025   SpO2 98%   BMI 22.31 kg/m     Constitutional: Well developed, well nourished. Comfortable appearing  HENT: Normocephalic, atraumatic, mucous membranes moist, nose normal  Mouth: posterior oropharynx without erythema, no exudates, uvula is midline. No tongue or lip swelling.  Eyes: Pupils mid range, sclera white, " no discharge  Neck- Gross ROM intact.   Respiratory: Normal work of breathing, normal rate, speaks in full sentences  Cardiovascular: Normal heart rate  Musculoskeletal: Moving all 4 extremities intentionally and without pain.  Skin: few raised wheels to the thighs, no other rash seen  Neurologic: Alert & oriented, cranial nerves grossly intact. Speech clear. No focal deficits noted    I, Destinee Bose, am serving as a scribe to document services personally performed by Dr. Anamika Waters based on my observation and the provider's statements to me. I, Anamika Waters MD attest that Destinee Bose is acting in a scribe capacity, has observed my performance of the services and has documented them in accordance with my direction.    Anamika Waters M.D.  Emergency Medicine  [facility] EMERGENCY DEPARTMENT  [address] [place] [id number]  [phone]  Dept: [phone]     Anamika Waters MD  07/22/25 2506

## 2025-07-23 NOTE — DISCHARGE INSTRUCTIONS
I recommend taking zyrtec every morning  Can use benadryl as needed every 6 hours   Can use the steroid cream on the itchiest spots for no more than 1 week  Follow up with allergy for long term management  Return to the Emergency Room with difficulty breathing, fevers, tongue/lip swelling or other worsening symptoms or concerns

## 2025-07-23 NOTE — ED TRIAGE NOTES
Pt allergic to grass. Needs a larger bottle of triamcinolone cream for this. Has some burning on her legs.     Triage Assessment (Adult)       Row Name 07/22/25 2047          Triage Assessment    Airway WDL WDL

## 2025-08-10 ENCOUNTER — HEALTH MAINTENANCE LETTER (OUTPATIENT)
Age: 20
End: 2025-08-10